# Patient Record
Sex: FEMALE | Race: ASIAN | URBAN - METROPOLITAN AREA
[De-identification: names, ages, dates, MRNs, and addresses within clinical notes are randomized per-mention and may not be internally consistent; named-entity substitution may affect disease eponyms.]

---

## 2017-01-31 ENCOUNTER — OFFICE VISIT (OUTPATIENT)
Dept: DERMATOLOGY | Facility: CLINIC | Age: 27
End: 2017-01-31

## 2017-01-31 DIAGNOSIS — L70.0 ACNE VULGARIS: Primary | ICD-10-CM
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
## 2017-01-31 RX ORDER — TRETINOIN 0.25 MG/G
CREAM TOPICAL
Qty: 45 G | Refills: 3 | Status: SHIPPED | OUTPATIENT
Start: 2017-01-31

## 2017-01-31 RX ORDER — TRIAMCINOLONE ACETONIDE 1 MG/G
OINTMENT TOPICAL
Qty: 80 G | Refills: 3 | Status: SHIPPED | OUTPATIENT
Start: 2017-01-31

## 2017-01-31 NOTE — PATIENT INSTRUCTIONS
Today, we will change tactics. We are concerned about possible bed bug bites.  1. We discussed with patient talking to her landlord about possible inspection/extermination.  2. We will prescribe triamcinolone 0.1% ointment for her to use once daily under bandage occlusion. Change once daily    #. Acne of the face:  - we will switch to tretinoin 0.025% cream nightly at bedtime

## 2017-01-31 NOTE — PROGRESS NOTES
"[facility] Dermatology Note      Dermatology Problem List:  #. Eruption of excoriated papules and macular hyperpigmentation:  Punch bx 06/23/2016 non-specific, favor acne and/or folliculitis w/ neurogenic excoriation.  Ddx arthropod assault v. other. (See pictures in chart 6/23/16, suspicious for \"breakfast, lunch and dinner\" of bed bug bites).  - Triamcinolone 0.1% oint qday under bandage occlusion  - discussed talking to landlord re: inspection/extermination  #. Acne excoriee:  -Initiate tretinoin 0.25% cream in place of adapalene      Encounter Date: Jan 31, 2017    CC:   Chief Complaint   Patient presents with     Derm Problem     follow up for a lesions on the face, hand and legs. There is not much of a change, may have made it worse.          History of Present Illness:  Ms. Lauren Dominique is a 26 year old female who presents for f/u of possible folliculitis. She was last seen 11/22/16 at which time it was felt that she had a resolving folliculitis and neurogenic excoriation and it was recommended she continue minocycline 50 mg BID, adapalene gel, and dilute bleach baths at least 2x/week. Notably, the rash was biopsied in June 2016 revealing non-specific lymphoplasmacytic inflammation with overlying excoriation consistent with excoriated resolving folliculitis.    Today, she reports that since her last visit she has been washing with Vanicream cleanser (bar soap prior), she tried Vanicream but it burned, made it uncomfortable and made it worse. She stopped the minocycline 2-4 weeks ago because she did not find it helpful and it gave her a stomach ache. She is using adapalene on her face once each day. Not sure if this is helping.    She notes that the oral antibiotic did not help. She notes that her brother has a a similar eruption but it is more mild. She also notes that the lesions seem to go away when she travels or is on vacation.    She notes that the spots are itchy and " "painful.    She is not interested in any more biopsies as the other was too expensive and took a long time to heal.  Past Medical History:   Patient Active Problem List   Diagnosis     Normal skin exam     Folliculitis     History reviewed. No pertinent past medical history.  Past Surgical History   Procedure Laterality Date     No history of surgery  2/14/14     derm       Medications:  Current Outpatient Prescriptions   Medication Sig Dispense Refill     adapalene (DIFFERIN) 0.1 % gel Apply a pea sized amount to the face at night. 45 g 3     norgestim-eth estrad triphasic (ORTHO TRI-CYCLEN, 28,) 0.18/0.215/0.25 MG-35 MCG TABS tablet Take 1 tablet by mouth daily       Dapsone 5 % GEL Apply twice daily to rash as needed. 45 g 2        Allergies   Allergen Reactions     No Known Drug Allergy          Review of Systems:  -Constitutional: The patient denies fatigue, fevers, chills, unintended weight loss, and night sweats.  -HEENT: Patient denies nonhealing oral sores.  -Skin: As above in HPI. No additional skin concerns.    Physical exam:  Gen: WDWN female, anxious appearing  Skin: Johan II-III. Focused skin exam of face, neck, chest, back, and bilateral upper and lower extremities revealed the following:  - Numerous excoriated papules on lower legs, scattered follicular and nonfollicular  - Similar areas of involvement on chin region, dorsal hands/forearms, and upper back  - No identifiable primary lesion  - Tan-violaceous macules on face, arms and legs at previous sites of inflammation    Impression/Plan:  #. Eruption of excoriated papules and macular hyperpigmentation: Given previous biopsy results, we suspect acne and/or folliculitis may be underlying neurogenic excoriations (acne excoriee), although differential diagnosis includes arthropod assault. (See pictures in chart 6/23/16, suspicious for \"breakfast, lunch and dinner\" of bed bug bites).    Today, we will change tactics given unsuccessful trmt of " folliculitis in past:  1. We discussed with patient talking to her landlord about possible inspection/extermination.  2. We will prescribe triamcinolone 0.1% ointment for her to use once daily under bandage occlusion (which we explained in detail)    #. Acne vulgaris of the face:  - we will switch to tretinoin 0.025% cream nightly at bedtime    RTC 6 weeks for re-check    Staff Involved:  Dr. Francisco staffed the patient.    Juliet Zimmerman MD  PGY-3 Dermatology  Pager: [phone]    Staff Physician Comments:   I saw and evaluated the patient with the resident and I agree with the assessment and plan.  I was present for the examination.    Donald Francisco MD  Dermatology Staff Physician  , Department of Dermatology

## 2017-01-31 NOTE — MR AVS SNAPSHOT
After Visit Summary   1/31/2017    Lauren Dominique    MRN: [id number]           Patient Information     Date Of Birth          1990        Visit Information        Provider Department      1/31/2017 2:30 PM Donald Francisco MD [facility] Dermatology        Today's Diagnoses     Acne vulgaris    -  1     Arthropod bite, subsequent encounter           Care Instructions    Today, we will change tactics. We are concerned about possible bed bug bites.  1. We discussed with patient talking to her landlord about possible inspection/extermination.  2. We will prescribe triamcinolone 0.1% ointment for her to use once daily under bandage occlusion. Change once daily    #. Acne of the face:  - we will switch to tretinoin 0.025% cream nightly at bedtime        Follow-ups after your visit        Your next 10 appointments already scheduled     Feb 22, 2017  7:30 AM   (Arrive by 7:15 AM)   Return Visit with Donald Francisco MD   [facility] Dermatology ([facility])    [address] [place] [id number] [phone]              Who to contact     Please call your clinic at [phone] to:    Ask questions about your health    Make or cancel appointments    Discuss your medicines    Learn about your test results    Speak to your doctor   If you have compliments or concerns about an experience at your clinic, or if you wish to file a complaint, please contact [facility] Patient Relations at [phone] or email us at [email or web address]         Additional Information About Your Visit        Care EveryWhere ID     This is your Care EveryWhere ID. This could be used by other organizations to access your [place] medical records  [id number]         Blood Pressure from Last 3 Encounters:   No data found for BP    Weight from Last 3 Encounters:   No data found for Wt              Today, you had the following     No orders  found for display         Today's Medication Changes          These changes are accurate as of: 1/31/17  3:49 PM.  If you have any questions, ask your nurse or doctor.               Start taking these medicines.        Dose/Directions    tretinoin 0.025 % cream   Commonly known as:  RETIN-A   Used for:  Acne vulgaris   Started by:  Donald Francisco MD        Apply a pea-sized amount in a thin layer over entire cleansed and dried face nightly at bedtime.   Quantity:  45 g   Refills:  3       triamcinolone 0.1 % ointment   Commonly known as:  KENALOG   Used for:  Arthropod bite, subsequent encounter   Started by:  Donald Francisco MD        Apply twice daily to any itching or open bumps and apply bandage over. Change once daily.   Quantity:  80 g   Refills:  3            Where to get your medicines      These medications were sent to [address] [address]    Hours:  TRANSPLANT PHONE NUMBER [phone] Phone:  [phone]    - tretinoin 0.025 % cream  - triamcinolone 0.1 % ointment             Primary Care Provider    Pcp Unknown Verified       No address on file        Thank you!     Thank you for choosing [facility] DERMATOLOGY  for your care. Our goal is always to provide you with excellent care. Hearing back from our patients is one way we can continue to improve our services. Please take a few minutes to complete the written survey that you may receive in the mail after your visit with us. Thank you!             Your Updated Medication List - Protect others around you: Learn how to safely use, store and throw away your medicines at www.disposemymeds.org.          This list is accurate as of: 1/31/17  3:49 PM.  Always use your most recent med list.                   Brand Name Dispense Instructions for use    ORTHO TRI-CYCLEN (28) 0.18/0.215/0.25 MG-35 MCG per tablet   Generic drug:  norgestim-eth  estrad triphasic      Take 1 tablet by mouth daily       tretinoin 0.025 % cream    RETIN-A    45 g    Apply a pea-sized amount in a thin layer over entire cleansed and dried face nightly at bedtime.       triamcinolone 0.1 % ointment    KENALOG    80 g    Apply twice daily to any itching or open bumps and apply bandage over. Change once daily.

## 2017-01-31 NOTE — NURSING NOTE
Dermatology Rooming Note    Lauren Dominique's goals for this visit include:   Chief Complaint   Patient presents with     Derm Problem     follow up for a lesions on the face, hand and legs. There is not much of a change, may have made it worse.      Adriane [facility]

## 2017-01-31 NOTE — LETTER
"1/31/2017       RE: Lauren Dominique  [address] [address]     Dear Colleague,    Thank you for referring your patient, Lauren Dominique, to the [facility] DERMATOLOGY at [facility]. Please see a copy of my visit note below.    [facility] Dermatology Note      Dermatology Problem List:  #. Eruption of excoriated papules and macular hyperpigmentation:  Punch bx 06/23/2016 non-specific, favor acne and/or folliculitis w/ neurogenic excoriation.  Ddx arthropod assault v. other. (See pictures in chart 6/23/16, suspicious for \"breakfast, lunch and dinner\" of bed bug bites).  - Triamcinolone 0.1% oint qday under bandage occlusion  - discussed talking to landlord re: inspection/extermination  #. Acne excoriee:  -Initiate tretinoin 0.25% cream in place of adapalene      Encounter Date: Jan 31, 2017    CC:   Chief Complaint   Patient presents with     Derm Problem     follow up for a lesions on the face, hand and legs. There is not much of a change, may have made it worse.          History of Present Illness:  Ms. Lauren Dominique is a 26 year old female who presents for f/u of possible folliculitis. She was last seen 11/22/16 at which time it was felt that she had a resolving folliculitis and neurogenic excoriation and it was recommended she continue minocycline 50 mg BID, adapalene gel, and dilute bleach baths at least 2x/week. Notably, the rash was biopsied in June 2016 revealing non-specific lymphoplasmacytic inflammation with overlying excoriation consistent with excoriated resolving folliculitis.    Today, she reports that since her last visit she has been washing with Vanicream cleanser (bar soap prior), she tried Vanicream but it burned, made it uncomfortable and made it worse. She stopped the minocycline 2-4 weeks ago because she did not find it helpful and it gave her a stomach ache. She is using adapalene on her face once each " day. Not sure if this is helping.    She notes that the oral antibiotic did not help. She notes that her brother has a a similar eruption but it is more mild. She also notes that the lesions seem to go away when she travels or is on vacation.    She notes that the spots are itchy and painful.    She is not interested in any more biopsies as the other was too expensive and took a long time to heal.  Past Medical History:   Patient Active Problem List   Diagnosis     Normal skin exam     Folliculitis     History reviewed. No pertinent past medical history.  Past Surgical History   Procedure Laterality Date     No history of surgery  2/14/14     derm       Medications:  Current Outpatient Prescriptions   Medication Sig Dispense Refill     adapalene (DIFFERIN) 0.1 % gel Apply a pea sized amount to the face at night. 45 g 3     norgestim-eth estrad triphasic (ORTHO TRI-CYCLEN, 28,) 0.18/0.215/0.25 MG-35 MCG TABS tablet Take 1 tablet by mouth daily       Dapsone 5 % GEL Apply twice daily to rash as needed. 45 g 2        Allergies   Allergen Reactions     No Known Drug Allergy          Review of Systems:  -Constitutional: The patient denies fatigue, fevers, chills, unintended weight loss, and night sweats.  -HEENT: Patient denies nonhealing oral sores.  -Skin: As above in HPI. No additional skin concerns.    Physical exam:  Gen: WDWN female, anxious appearing  Skin: Johan II-III. Focused skin exam of face, neck, chest, back, and bilateral upper and lower extremities revealed the following:  - Numerous excoriated papules on lower legs, scattered follicular and nonfollicular  - Similar areas of involvement on chin region, dorsal hands/forearms, and upper back  - No identifiable primary lesion  - Tan-violaceous macules on face, arms and legs at previous sites of inflammation    Impression/Plan:  #. Eruption of excoriated papules and macular hyperpigmentation: Given previous biopsy results, we suspect acne and/or folliculitis  "may be underlying neurogenic excoriations (acne excoriee), although differential diagnosis includes arthropod assault. (See pictures in chart 6/23/16, suspicious for \"breakfast, lunch and dinner\" of bed bug bites).    Today, we will change tactics given unsuccessful trmt of folliculitis in past:  1. We discussed with patient talking to her landlord about possible inspection/extermination.  2. We will prescribe triamcinolone 0.1% ointment for her to use once daily under bandage occlusion (which we explained in detail)    #. Acne vulgaris of the face:  - we will switch to tretinoin 0.025% cream nightly at bedtime    RTC 6 weeks for re-check    Staff Involved:  Dr. Frnacisco staffed the patient.    Juliet Zimmerman MD  PGY-3 Dermatology  Pager: [phone]    Staff Physician Comments:   I saw and evaluated the patient with the resident and I agree with the assessment and plan.  I was present for the examination.    Donald Francisco MD  Dermatology Staff Physician  , Department of Dermatology            "